# Patient Record
(demographics unavailable — no encounter records)

---

## 2024-10-13 NOTE — CONSULT LETTER
[Dear  ___] : Dear  [unfilled], [Consult Letter:] : I had the pleasure of evaluating your patient, [unfilled]. [Please see my note below.] : Please see my note below. [Consult Closing:] : Thank you very much for allowing me to participate in the care of this patient.  If you have any questions, please do not hesitate to contact me. [Sincerely,] : Sincerely, [DrLuz  ___] : Dr. ORTIZ [DrLuz ___] : Dr. ORTIZ

## 2024-10-17 NOTE — HISTORY OF PRESENT ILLNESS
[de-identified] : Mr. 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  His most recent procedure for this problem was MARCH 2023. He had excision of the area by dermatology (Dr. Puneet Hernandez) with clinically negative, but microscopically involved margins both peripherally and on the deep aspect.  No further therapy was recommended.  The patient was asymptomatic until November 2022 when he started to develop stabbing pains in the right temple, associated with growth of a mass in the area prompting him to return to dermatology .  An excisional biopsy in March 2023 by dermatology, diagnosed the recurrence. Margins were involved with procedure, and surgical consultation been recommended at that time. However, since his symptoms subsided after the excisional biopsy of the recurrence, he did not attention until now when the pain and swelling have resumed/recurred.  Pathology from May 2022 indicated negative margins around a poorly differentiated carcinoma. However, at that time the concern of a metastatic lesion was raised. No further indications or treatment for recommended at that time.  PET/CT 3/20/24: 1. Asymmetrically enlarged right parotid gland with heterogeneous increased FDG activity is suspicious for patient's primary malignancy. Further evaluation may be performed with ultrasound and percutaneous needle biopsy. 2. FDG-avid soft tissue thickening in superolateral aspect of right orbit, extending to right temporal region, corresponding to enhancing soft tissue seen on MRI dated 3/15/2024/known metastasis from a colon primary. 3. Few minimally FDG-avid bilateral upper lobe pulmonary nodules are indeterminate. If images of a prior CT chest are made available, direct comparison will be made and an addendum issued. Alternatively, dedicated CT of chest is recommended for further characterization. 4. Minimally FDG-avid bilateral adrenal nodules are indeterminate. Further characterization may be performed with MRI. 5. Nonspecific diffuse hypermetabolism in fundus and body of stomach with suggestion of wall thickening on CT. Please correlate clinically and with endoscopy. 6. Increased FDG activity in muscles along the anterior aspect of the proximal bilateral femurs may reflect inflammation. Please correlate clinically. 7. Small FDG-avid focus, left mid tibia, with corresponding abnormality on CT, is indeterminate. Further characterization may be performed with MRI.  MR head 3/15/24:  - Enhancing tissue within the right superolateral orbit extending from the orbital rim towards the superior orbital fissure. This tissue extends superficially into the right superolateral periorbital soft tissues with a bulky component which may be palpable. This is suspicious for recurrent viable neoplasm.  -In addition, there is heterogeneous enhancement and enlargement of the right parotid gland suspicious for metastatic disease. -Atrophy of the bilateral optic nerves and optic chiasm suggested.  On 4/9/24 underwent US guided FNA biopsy of right parotid mass:  Cytology: Poorly differentiated carcinoma with squamous differentiation.  Medical oncology consulted to discuss systemic neoadjuvant therapy to downstage tumor for potential resection in the future, vs primary therapy.  PAIN Stabbing pain right ear and eye.  5/23/24 - Angelita Present during visit, C1 Libtayo 5/2 C2 5/23 Pain on his right eye and ear has improved over the last 2-3 days. The swelling is less as well.  He continues to be teary from the right eye. Using Gabapentin 100 mg PO TID with relief. Mass also appears to be less swollen. Admits to fatigue on days 1-4 of his cycle which improved afterwards. Had an episode of "shakes" during D#3 of his cycle which subsided without acute intervention. No other irAEs noted. Admits to pain while swallowing solids. He is using protein shakes as recommended by nutrition.  6/13/24 C3 Libtayo No major irAEs. The pain and swelling on the right eye has improved while on treatment. The pain in the R ear has subsided. Only feels warmth but denies pain. His main complaint is excessive tearing from the right eye. He always needs to carry a handkerchief due to excessive watering. Eating well.  7/3/24 C4 Libtayo No major irAEs. The pain and swelling on the right eye and right temple has improved. He is able to open his mouth without any issues. Prior to treatment was having severe pain. The lesion on his right cantus is responding to treatment.  7/24/24 C5 Libtayo today. No major irAEs. The lesion on his right temple is responding appropriately to treatment. Continues to have issues with watery tear on the right eye.  9/3 TEB Reina: Jennifer accompanied on TEB C7 Libtayo on 9/7 No major irAEs The lesion on his right temple appears to be crusted over. Admits to less lacrimation from his right eye and pain. Not using pain medications. Labs on 8/18 are WNL. No acute abnormalities.  9/26/24 C8 Libtayo No major irAEs noted from treatment. The lesion on his right temple appears to be crusted over. Admits to less lacrimation from his right eye and pain. Not using pain medications. Using eye drops to the right eye. Patient will schedule MRI of the head.  10/17/2024 Patient seen in treatment room accompanied by Jose to receive C9 Libtayo today. The lesion on his right temple appears to be crusted over. Admits to less lacrimation from his right eye and pain. Not using pain medications. Using eye drops to the right eye. Has MR head scheduled 10/13/2024; pending read  Will also recommend to see Dr Curry for follow-up evaluation. I do not think more surgery is indicated, but there is a new subcutaneous lesion ~ 1.5 cm that is soft and mobile.  [de-identified] : Surgical oncology: Al Curry Dermatology: Puneet Hernandez Internist: Jermaine Schmidt  Cardiology: Irwin Killian Ophthalmology: Matthew Goldstein - Yovani Jones (is HCP as well) cell [phone] Jannetteshonna Jones - Jennifer lives 1 floor above in aprt building in [place] Sister - Saranya lives next door to him in same aprt building

## 2024-10-17 NOTE — PHYSICAL EXAM
[Ambulatory and capable of all self care but unable to carry out any work activities] : Status 2- Ambulatory and capable of all self care but unable to carry out any work activities. Up and about more than 50% of waking hours [Thin] : thin [Normal] : normal appearance, no rash, nodules, vesicles, ulcers, erythema [de-identified] : Right lateral canthus deformity and tumor into parotid.

## 2024-10-17 NOTE — HISTORY OF PRESENT ILLNESS
[de-identified] : Mr. 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  His most recent procedure for this problem was MARCH 2023. He had excision of the area by dermatology (Dr. Puneet Hernandez) with clinically negative, but microscopically involved margins both peripherally and on the deep aspect.  No further therapy was recommended.  The patient was asymptomatic until November 2022 when he started to develop stabbing pains in the right temple, associated with growth of a mass in the area prompting him to return to dermatology .  An excisional biopsy in March 2023 by dermatology, diagnosed the recurrence. Margins were involved with procedure, and surgical consultation been recommended at that time. However, since his symptoms subsided after the excisional biopsy of the recurrence, he did not attention until now when the pain and swelling have resumed/recurred.  Pathology from May 2022 indicated negative margins around a poorly differentiated carcinoma. However, at that time the concern of a metastatic lesion was raised. No further indications or treatment for recommended at that time.  PET/CT 3/20/24: 1. Asymmetrically enlarged right parotid gland with heterogeneous increased FDG activity is suspicious for patient's primary malignancy. Further evaluation may be performed with ultrasound and percutaneous needle biopsy. 2. FDG-avid soft tissue thickening in superolateral aspect of right orbit, extending to right temporal region, corresponding to enhancing soft tissue seen on MRI dated 3/15/2024/known metastasis from a colon primary. 3. Few minimally FDG-avid bilateral upper lobe pulmonary nodules are indeterminate. If images of a prior CT chest are made available, direct comparison will be made and an addendum issued. Alternatively, dedicated CT of chest is recommended for further characterization. 4. Minimally FDG-avid bilateral adrenal nodules are indeterminate. Further characterization may be performed with MRI. 5. Nonspecific diffuse hypermetabolism in fundus and body of stomach with suggestion of wall thickening on CT. Please correlate clinically and with endoscopy. 6. Increased FDG activity in muscles along the anterior aspect of the proximal bilateral femurs may reflect inflammation. Please correlate clinically. 7. Small FDG-avid focus, left mid tibia, with corresponding abnormality on CT, is indeterminate. Further characterization may be performed with MRI.  MR head 3/15/24:  - Enhancing tissue within the right superolateral orbit extending from the orbital rim towards the superior orbital fissure. This tissue extends superficially into the right superolateral periorbital soft tissues with a bulky component which may be palpable. This is suspicious for recurrent viable neoplasm.  -In addition, there is heterogeneous enhancement and enlargement of the right parotid gland suspicious for metastatic disease. -Atrophy of the bilateral optic nerves and optic chiasm suggested.  On 4/9/24 underwent US guided FNA biopsy of right parotid mass:  Cytology: Poorly differentiated carcinoma with squamous differentiation.  Medical oncology consulted to discuss systemic neoadjuvant therapy to downstage tumor for potential resection in the future, vs primary therapy.  PAIN Stabbing pain right ear and eye.  5/23/24 - Angelita Present during visit, C1 Libtayo 5/2 C2 5/23 Pain on his right eye and ear has improved over the last 2-3 days. The swelling is less as well.  He continues to be teary from the right eye. Using Gabapentin 100 mg PO TID with relief. Mass also appears to be less swollen. Admits to fatigue on days 1-4 of his cycle which improved afterwards. Had an episode of "shakes" during D#3 of his cycle which subsided without acute intervention. No other irAEs noted. Admits to pain while swallowing solids. He is using protein shakes as recommended by nutrition.  6/13/24 C3 Libtayo No major irAEs. The pain and swelling on the right eye has improved while on treatment. The pain in the R ear has subsided. Only feels warmth but denies pain. His main complaint is excessive tearing from the right eye. He always needs to carry a handkerchief due to excessive watering. Eating well.  7/3/24 C4 Libtayo No major irAEs. The pain and swelling on the right eye and right temple has improved. He is able to open his mouth without any issues. Prior to treatment was having severe pain. The lesion on his right cantus is responding to treatment.  7/24/24 C5 Libtayo today. No major irAEs. The lesion on his right temple is responding appropriately to treatment. Continues to have issues with watery tear on the right eye.  9/3 TEB Reina: Jennifer accompanied on TEB C7 Libtayo on 9/7 No major irAEs The lesion on his right temple appears to be crusted over. Admits to less lacrimation from his right eye and pain. Not using pain medications. Labs on 8/18 are WNL. No acute abnormalities.  9/26/24 C8 Libtayo No major irAEs noted from treatment. The lesion on his right temple appears to be crusted over. Admits to less lacrimation from his right eye and pain. Not using pain medications. Using eye drops to the right eye. Patient will schedule MRI of the head.  10/17/2024 Patient seen in treatment room accompanied by Jose to receive C9 Libtayo today. The lesion on his right temple appears to be crusted over. Admits to less lacrimation from his right eye and pain. Not using pain medications. Using eye drops to the right eye. Has MR head scheduled 10/13/2024; pending read  Will also recommend to see Dr Curry for follow-up evaluation. I do not think more surgery is indicated, but there is a new subcutaneous lesion ~ 1.5 cm that is soft and mobile.  [de-identified] : Surgical oncology: Al Curry Dermatology: Puneet Hernandez Internist: Jermaine Schmidt  Cardiology: Irwin Killian Ophthalmology: Matthew Goldstein - Yovani Jones (is HCP as well) cell [phone] Jannetteshonna Jones - Jennifer lives 1 floor above in aprt building in [place] Sister - Saranya lives next door to him in same aprt building

## 2024-10-17 NOTE — PHYSICAL EXAM
[Ambulatory and capable of all self care but unable to carry out any work activities] : Status 2- Ambulatory and capable of all self care but unable to carry out any work activities. Up and about more than 50% of waking hours [Thin] : thin [Normal] : normal appearance, no rash, nodules, vesicles, ulcers, erythema [de-identified] : Right lateral canthus deformity and tumor into parotid.

## 2024-10-17 NOTE — ASSESSMENT
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 The patient was asymptomatic until November 2022 when he started to develop stabbing pains in the right temple, associated with growth of a mass in the area prompting him to return to dermatology. An excisional biopsy in March 2023 by dermatology, diagnosed the recurrence. Margins were involved with procedure, and surgical consultation been recommended at that time. However, since his symptoms subsided after the excisional biopsy of the recurrence, he did not pay attention until now when the pain and swelling have resumed/recurred.  PET/CT 3/20/24: 1. Asymmetrically enlarged right parotid gland with heterogeneous increased FDG activity is suspicious for patient's primary malignancy. Further evaluation may be performed with ultrasound and percutaneous needle biopsy. 2. FDG-avid soft tissue thickening in superolateral aspect of right orbit, extending to right temporal region, corresponding to enhancing soft tissue seen on MRI dated 3/15/2024/known metastasis from a colon primary.  MR head 3/15/24:  - Enhancing tissue within the right superolateral orbit extending from the orbital rim towards the superior orbital fissure. This tissue extends superficially into the right superolateral periorbital soft tissues with a bulky component which may be palpable. This is suspicious for recurrent viable neoplasm.  -In addition, there is heterogeneous enhancement and enlargement of the right parotid gland suspicious for metastatic disease.  On 4/9/24 underwent US guided FNA biopsy of right parotid mass:  Cytology: Poorly differentiated carcinoma with squamous differentiation.  C1 Libtayo 5/2/24 C9 10/17/24  Patient is responding well to treatment.  The mass on his right lateral canthus is nearly absent. The pain in both his right eye and right temple has improved substantially. Only using Gabapentin at bedtime now.  He is able to open his R eye. The constant tearing from his right eye has improved. Admits to pruritus and intermittent episode of otalgia but not othersome. Continue to follow up with ophthalmology.   MRI head performed 10/13/2024; pending read; will expediate   Continue to follow up with Dr. Curry to consider possible bone grafting for orbital bony protrusion.  labs reviewed.  Has MR head scheduled 10/13/2024; pending read  Will also recommend to see Dr Curry for follow-up evaluation. I do not think more surgery is indicated, but there is a new subcutaneous lesion ~ 1.5 cm that is soft and mobile.  Given vision issues, and cataract ?, will contact ophthalmologist to proceed with treatment. f/u q cycle.

## 2024-11-07 NOTE — PHYSICAL EXAM
[Ambulatory and capable of all self care but unable to carry out any work activities] : Status 2- Ambulatory and capable of all self care but unable to carry out any work activities. Up and about more than 50% of waking hours [Thin] : thin [Normal] : normal appearance, no rash, nodules, vesicles, ulcers, erythema [de-identified] : Right lateral canthus deformity and tumor into parotid.

## 2024-11-07 NOTE — HISTORY OF PRESENT ILLNESS
[de-identified] : Mr. 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  His most recent procedure for this problem was MARCH 2023. He had excision of the area by dermatology (Dr. Puneet Hernandez) with clinically negative, but microscopically involved margins both peripherally and on the deep aspect.  No further therapy was recommended.  The patient was asymptomatic until November 2022 when he started to develop stabbing pains in the right temple, associated with growth of a mass in the area prompting him to return to dermatology .  An excisional biopsy in March 2023 by dermatology, diagnosed the recurrence. Margins were involved with procedure, and surgical consultation been recommended at that time. However, since his symptoms subsided after the excisional biopsy of the recurrence, he did not attention until now when the pain and swelling have resumed/recurred.  Pathology from May 2022 indicated negative margins around a poorly differentiated carcinoma. However, at that time the concern of a metastatic lesion was raised. No further indications or treatment for recommended at that time.  PET/CT 3/20/24: 1. Asymmetrically enlarged right parotid gland with heterogeneous increased FDG activity is suspicious for patient's primary malignancy. Further evaluation may be performed with ultrasound and percutaneous needle biopsy. 2. FDG-avid soft tissue thickening in superolateral aspect of right orbit, extending to right temporal region, corresponding to enhancing soft tissue seen on MRI dated 3/15/2024/known metastasis from a colon primary. 3. Few minimally FDG-avid bilateral upper lobe pulmonary nodules are indeterminate. If images of a prior CT chest are made available, direct comparison will be made and an addendum issued. Alternatively, dedicated CT of chest is recommended for further characterization. 4. Minimally FDG-avid bilateral adrenal nodules are indeterminate. Further characterization may be performed with MRI. 5. Nonspecific diffuse hypermetabolism in fundus and body of stomach with suggestion of wall thickening on CT. Please correlate clinically and with endoscopy. 6. Increased FDG activity in muscles along the anterior aspect of the proximal bilateral femurs may reflect inflammation. Please correlate clinically. 7. Small FDG-avid focus, left mid tibia, with corresponding abnormality on CT, is indeterminate. Further characterization may be performed with MRI.  MR head 3/15/24:  - Enhancing tissue within the right superolateral orbit extending from the orbital rim towards the superior orbital fissure. This tissue extends superficially into the right superolateral periorbital soft tissues with a bulky component which may be palpable. This is suspicious for recurrent viable neoplasm.  -In addition, there is heterogeneous enhancement and enlargement of the right parotid gland suspicious for metastatic disease. -Atrophy of the bilateral optic nerves and optic chiasm suggested.  On 4/9/24 underwent US guided FNA biopsy of right parotid mass:  Cytology: Poorly differentiated carcinoma with squamous differentiation.  Medical oncology consulted to discuss systemic neoadjuvant therapy to downstage tumor for potential resection in the future, vs primary therapy.  PAIN Stabbing pain right ear and eye.  5/23/24 - Angelita Present during visit, C1 Libtayo 5/2 C2 5/23 Pain on his right eye and ear has improved over the last 2-3 days. The swelling is less as well.  He continues to be teary from the right eye. Using Gabapentin 100 mg PO TID with relief. Mass also appears to be less swollen. Admits to fatigue on days 1-4 of his cycle which improved afterwards. Had an episode of "shakes" during D#3 of his cycle which subsided without acute intervention. No other irAEs noted. Admits to pain while swallowing solids. He is using protein shakes as recommended by nutrition.  6/13/24 C3 Libtayo No major irAEs. The pain and swelling on the right eye has improved while on treatment. The pain in the R ear has subsided. Only feels warmth but denies pain. His main complaint is excessive tearing from the right eye. He always needs to carry a handkerchief due to excessive watering. Eating well.  7/3/24 C4 Libtayo No major irAEs. The pain and swelling on the right eye and right temple has improved. He is able to open his mouth without any issues. Prior to treatment was having severe pain. The lesion on his right cantus is responding to treatment.  7/24/24 C5 Libtayo today. No major irAEs. The lesion on his right temple is responding appropriately to treatment. Continues to have issues with watery tear on the right eye.  9/3 TEB Reina: Jennifer accompanied on TEB C7 Libtayo on 9/7 No major irAEs The lesion on his right temple appears to be crusted over. Admits to less lacrimation from his right eye and pain. Not using pain medications. Labs on 8/18 are WNL. No acute abnormalities.  9/26/24 C8 Libtayo No major irAEs noted from treatment. The lesion on his right temple appears to be crusted over. Admits to less lacrimation from his right eye and pain. Not using pain medications. Using eye drops to the right eye. Patient will schedule MRI of the head.  10/17/2024 Patient seen in treatment room accompanied by Jose to receive C9 Libtayo today. The lesion on his right temple appears to be crusted over. Admits to less lacrimation from his right eye and pain. Not using pain medications. Using eye drops to the right eye. Has MR head scheduled 10/13/2024; pending read  Will also recommend to see Dr Curry for follow-up evaluation. I do not think more surgery is indicated, but there is a new subcutaneous lesion ~ 1.5 cm that is soft and mobile.  11/7/24 C10 Libtayo today MR head on 10/13/24 reveals mix response. Improved local disease in the right orbit and parotid gland, suboptimally evaluated here. 3.  Worsened metastatic lymph node in the right temporalis fossa. 4.  Stable chronic infarcts and senescent cerebral changes.   [de-identified] : Surgical oncology: Al Curry Dermatology: Puneet Hernandez Internist: Jermaine Schmidt  Cardiology: Irwin Killian Ophthalmology: Matthew Goldstein - Yovani Jones (is HCP as well) cell [phone] Jannetteshonna Jonse - Jennifer lives 1 floor above in aprt building in [place] Sister - Saranya lives next door to him in same aprt building

## 2024-11-07 NOTE — ASSESSMENT
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 The patient was asymptomatic until November 2022 when he started to develop stabbing pains in the right temple, associated with growth of a mass in the area prompting him to return to dermatology. An excisional biopsy in March 2023 by dermatology, diagnosed the recurrence. Margins were involved with procedure, and surgical consultation been recommended at that time. However, since his symptoms subsided after the excisional biopsy of the recurrence, he did not pay attention until now when the pain and swelling have resumed/recurred.  PET/CT 3/20/24: 1. Asymmetrically enlarged right parotid gland with heterogeneous increased FDG activity is suspicious for patient's primary malignancy. Further evaluation may be performed with ultrasound and percutaneous needle biopsy. 2. FDG-avid soft tissue thickening in superolateral aspect of right orbit, extending to right temporal region, corresponding to enhancing soft tissue seen on MRI dated 3/15/2024/known metastasis from a colon primary.  MR head 3/15/24:  - Enhancing tissue within the right superolateral orbit extending from the orbital rim towards the superior orbital fissure. This tissue extends superficially into the right superolateral periorbital soft tissues with a bulky component which may be palpable. This is suspicious for recurrent viable neoplasm.  -In addition, there is heterogeneous enhancement and enlargement of the right parotid gland suspicious for metastatic disease.  On 4/9/24 underwent US guided FNA biopsy of right parotid mass:  Cytology: Poorly differentiated carcinoma with squamous differentiation.  C1 Libtayo 5/2/24 C10 11/7/24  Patient is responding well to treatment.  The mass on his right lateral canthus is nearly absent. The pain in both his right eye and right temple has improved substantially. Only using Gabapentin at bedtime now.  He is able to open his R eye. The constant tearing from his right eye has improved. Admits to pruritus and intermittent episode of otalgia but not othersome. Continue to follow up with ophthalmology.   He underwent MR head on 10/13/24 which reveals the following;  Improved local disease in the right orbit and parotid gland, suboptimally evaluated here. 3.  Worsened metastatic lymph node in the right temporalis fossa. 4.  Stable chronic infarcts and senescent cerebral changes.  Patient was advised to speak with Dr. Curry for consideration of resection.  F/U q cycle.  Dr. Marie agrees with above plan.

## 2024-11-27 NOTE — HISTORY OF PRESENT ILLNESS
[de-identified] : Mr. 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  His most recent procedure for this problem was MARCH 2023. He had excision of the area by dermatology (Dr. Puneet Hernandez) with clinically negative, but microscopically involved margins both peripherally and on the deep aspect.  No further therapy was recommended.  The patient was asymptomatic until November 2022 when he started to develop stabbing pains in the right temple, associated with growth of a mass in the area prompting him to return to dermatology .  An excisional biopsy in March 2023 by dermatology, diagnosed the recurrence. Margins were involved with procedure, and surgical consultation been recommended at that time. However, since his symptoms subsided after the excisional biopsy of the recurrence, he did not attention until now when the pain and swelling have resumed/recurred.  Pathology from May 2022 indicated negative margins around a poorly differentiated carcinoma. However, at that time the concern of a metastatic lesion was raised. No further indications or treatment for recommended at that time.  PET/CT 3/20/24: 1. Asymmetrically enlarged right parotid gland with heterogeneous increased FDG activity is suspicious for patient's primary malignancy. Further evaluation may be performed with ultrasound and percutaneous needle biopsy. 2. FDG-avid soft tissue thickening in superolateral aspect of right orbit, extending to right temporal region, corresponding to enhancing soft tissue seen on MRI dated 3/15/2024/known metastasis from a colon primary. 3. Few minimally FDG-avid bilateral upper lobe pulmonary nodules are indeterminate. If images of a prior CT chest are made available, direct comparison will be made and an addendum issued. Alternatively, dedicated CT of chest is recommended for further characterization. 4. Minimally FDG-avid bilateral adrenal nodules are indeterminate. Further characterization may be performed with MRI. 5. Nonspecific diffuse hypermetabolism in fundus and body of stomach with suggestion of wall thickening on CT. Please correlate clinically and with endoscopy. 6. Increased FDG activity in muscles along the anterior aspect of the proximal bilateral femurs may reflect inflammation. Please correlate clinically. 7. Small FDG-avid focus, left mid tibia, with corresponding abnormality on CT, is indeterminate. Further characterization may be performed with MRI.  MR head 3/15/24:  - Enhancing tissue within the right superolateral orbit extending from the orbital rim towards the superior orbital fissure. This tissue extends superficially into the right superolateral periorbital soft tissues with a bulky component which may be palpable. This is suspicious for recurrent viable neoplasm.  -In addition, there is heterogeneous enhancement and enlargement of the right parotid gland suspicious for metastatic disease. -Atrophy of the bilateral optic nerves and optic chiasm suggested.  On 4/9/24 underwent US guided FNA biopsy of right parotid mass:  Cytology: Poorly differentiated carcinoma with squamous differentiation.  Medical oncology consulted to discuss systemic neoadjuvant therapy to downstage tumor for potential resection in the future, vs primary therapy.  PAIN Stabbing pain right ear and eye.  5/23/24 - Angelita Present during visit, C1 Libtayo 5/2 C2 5/23 Pain on his right eye and ear has improved over the last 2-3 days. The swelling is less as well.  He continues to be teary from the right eye. Using Gabapentin 100 mg PO TID with relief. Mass also appears to be less swollen. Admits to fatigue on days 1-4 of his cycle which improved afterwards. Had an episode of "shakes" during D#3 of his cycle which subsided without acute intervention. No other irAEs noted. Admits to pain while swallowing solids. He is using protein shakes as recommended by nutrition.  6/13/24 C3 Libtayo No major irAEs. The pain and swelling on the right eye has improved while on treatment. The pain in the R ear has subsided. Only feels warmth but denies pain. His main complaint is excessive tearing from the right eye. He always needs to carry a handkerchief due to excessive watering. Eating well.  7/3/24 C4 Libtayo No major irAEs. The pain and swelling on the right eye and right temple has improved. He is able to open his mouth without any issues. Prior to treatment was having severe pain. The lesion on his right cantus is responding to treatment.  7/24/24 C5 Libtayo today. No major irAEs. The lesion on his right temple is responding appropriately to treatment. Continues to have issues with watery tear on the right eye.  9/3 TEB Reina: Jennifer accompanied on TEB C7 Libtayo on 9/7 No major irAEs The lesion on his right temple appears to be crusted over. Admits to less lacrimation from his right eye and pain. Not using pain medications. Labs on 8/18 are WNL. No acute abnormalities.  9/26/24 C8 Libtayo No major irAEs noted from treatment. The lesion on his right temple appears to be crusted over. Admits to less lacrimation from his right eye and pain. Not using pain medications. Using eye drops to the right eye. Patient will schedule MRI of the head.  10/17/2024 Patient seen in treatment room accompanied by nancy and MARIA DE JESUS to receive C9 Libtayo today. The lesion on his right temple appears to be crusted over. Admits to less lacrimation from his right eye and pain. Not using pain medications. Using eye drops to the right eye. Has MR head scheduled 10/13/2024; pending read  Will also recommend to see Dr Curry for follow-up evaluation. I do not think more surgery is indicated, but there is a new subcutaneous lesion ~ 1.5 cm that is soft and mobile.  11/27/24 C11 Libtayo today MR head on 10/13/24 reveals mix response. Improved local disease in the right orbit and parotid gland, suboptimally evaluated here. 3.  Worsened metastatic lymph node in the right temporalis fossa. 4.  Stable chronic infarcts and senescent cerebral changes. Doing relatively well on treatment. No major irAEs.   [de-identified] : Surgical oncology: Al Curry Dermatology: Puneet Hernandez Internist: Jermaine Schmidt  Cardiology: Irwin Killian Ophthalmology: Matthew Goldstein - Yovani Jones (is HCP as well) cell [phone] Jannetteshonna Jones - Jennifer lives 1 floor above in aprt building in [place] Sister - Saranya lives next door to him in same aprt building

## 2024-11-27 NOTE — PHYSICAL EXAM
[Ambulatory and capable of all self care but unable to carry out any work activities] : Status 2- Ambulatory and capable of all self care but unable to carry out any work activities. Up and about more than 50% of waking hours [Thin] : thin [Normal] : normal appearance, no rash, nodules, vesicles, ulcers, erythema [de-identified] : Right lateral canthus deformity and tumor into parotid.

## 2024-11-27 NOTE — ASSESSMENT
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 The patient was asymptomatic until November 2022 when he started to develop stabbing pains in the right temple, associated with growth of a mass in the area prompting him to return to dermatology. An excisional biopsy in March 2023 by dermatology, diagnosed the recurrence. Margins were involved with procedure, and surgical consultation been recommended at that time. However, since his symptoms subsided after the excisional biopsy of the recurrence, he did not pay attention until now when the pain and swelling have resumed/recurred.  PET/CT 3/20/24: 1. Asymmetrically enlarged right parotid gland with heterogeneous increased FDG activity is suspicious for patient's primary malignancy. Further evaluation may be performed with ultrasound and percutaneous needle biopsy. 2. FDG-avid soft tissue thickening in superolateral aspect of right orbit, extending to right temporal region, corresponding to enhancing soft tissue seen on MRI dated 3/15/2024/known metastasis from a colon primary.  MR head 3/15/24:  - Enhancing tissue within the right superolateral orbit extending from the orbital rim towards the superior orbital fissure. This tissue extends superficially into the right superolateral periorbital soft tissues with a bulky component which may be palpable. This is suspicious for recurrent viable neoplasm.  -In addition, there is heterogeneous enhancement and enlargement of the right parotid gland suspicious for metastatic disease.  On 4/9/24 underwent US guided FNA biopsy of right parotid mass:  Cytology: Poorly differentiated carcinoma with squamous differentiation.  C1 Libtayo 5/2/24 C11 11/27/24  Patient is responding well to treatment.  The mass on his right lateral canthus is nearly absent. The pain in both his right eye and right temple has improved substantially. Only using Gabapentin at bedtime now.  He is able to open his R eye. The constant tearing from his right eye has improved. Admits to pruritus and intermittent episode of otalgia but not othersome. Continue to follow up with ophthalmology.   He underwent MR head on 10/13/24 which reveals the following;  Improved local disease in the right orbit and parotid gland, suboptimally evaluated here. 3.  Worsened metastatic lymph node in the right temporalis fossa. 4.  Stable chronic infarcts and senescent cerebral changes.  Patient was advised to speak with Dr. Curry for consideration of resection. Patient was seen by Dr. Curry and will perform US biopsy of 12/3/24  F/U q cycle.

## 2024-12-19 NOTE — PHYSICAL EXAM
[Ambulatory and capable of all self care but unable to carry out any work activities] : Status 2- Ambulatory and capable of all self care but unable to carry out any work activities. Up and about more than 50% of waking hours [Thin] : thin [Normal] : normal appearance, no rash, nodules, vesicles, ulcers, erythema [de-identified] : Right lateral canthus deformity and tumor into parotid.

## 2024-12-19 NOTE — HISTORY OF PRESENT ILLNESS
[de-identified] : Mr. 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  His most recent procedure for this problem was MARCH 2023. He had excision of the area by dermatology (Dr. Puneet Hernandez) with clinically negative, but microscopically involved margins both peripherally and on the deep aspect.  No further therapy was recommended.  The patient was asymptomatic until November 2022 when he started to develop stabbing pains in the right temple, associated with growth of a mass in the area prompting him to return to dermatology .  An excisional biopsy in March 2023 by dermatology, diagnosed the recurrence. Margins were involved with procedure, and surgical consultation been recommended at that time. However, since his symptoms subsided after the excisional biopsy of the recurrence, he did not attention until now when the pain and swelling have resumed/recurred.  Pathology from May 2022 indicated negative margins around a poorly differentiated carcinoma. However, at that time the concern of a metastatic lesion was raised. No further indications or treatment for recommended at that time.  PET/CT 3/20/24: 1. Asymmetrically enlarged right parotid gland with heterogeneous increased FDG activity is suspicious for patient's primary malignancy. Further evaluation may be performed with ultrasound and percutaneous needle biopsy. 2. FDG-avid soft tissue thickening in superolateral aspect of right orbit, extending to right temporal region, corresponding to enhancing soft tissue seen on MRI dated 3/15/2024/known metastasis from a colon primary. 3. Few minimally FDG-avid bilateral upper lobe pulmonary nodules are indeterminate. If images of a prior CT chest are made available, direct comparison will be made and an addendum issued. Alternatively, dedicated CT of chest is recommended for further characterization. 4. Minimally FDG-avid bilateral adrenal nodules are indeterminate. Further characterization may be performed with MRI. 5. Nonspecific diffuse hypermetabolism in fundus and body of stomach with suggestion of wall thickening on CT. Please correlate clinically and with endoscopy. 6. Increased FDG activity in muscles along the anterior aspect of the proximal bilateral femurs may reflect inflammation. Please correlate clinically. 7. Small FDG-avid focus, left mid tibia, with corresponding abnormality on CT, is indeterminate. Further characterization may be performed with MRI.  MR head 3/15/24:  - Enhancing tissue within the right superolateral orbit extending from the orbital rim towards the superior orbital fissure. This tissue extends superficially into the right superolateral periorbital soft tissues with a bulky component which may be palpable. This is suspicious for recurrent viable neoplasm.  -In addition, there is heterogeneous enhancement and enlargement of the right parotid gland suspicious for metastatic disease. -Atrophy of the bilateral optic nerves and optic chiasm suggested.  On 4/9/24 underwent US guided FNA biopsy of right parotid mass:  Cytology: Poorly differentiated carcinoma with squamous differentiation.  Medical oncology consulted to discuss systemic neoadjuvant therapy to downstage tumor for potential resection in the future, vs primary therapy.  PAIN Stabbing pain right ear and eye.  5/23/24 - Angelita Present during visit, C1 Libtayo 5/2 C2 5/23 Pain on his right eye and ear has improved over the last 2-3 days. The swelling is less as well.  He continues to be teary from the right eye. Using Gabapentin 100 mg PO TID with relief. Mass also appears to be less swollen. Admits to fatigue on days 1-4 of his cycle which improved afterwards. Had an episode of "shakes" during D#3 of his cycle which subsided without acute intervention. No other irAEs noted. Admits to pain while swallowing solids. He is using protein shakes as recommended by nutrition.  6/13/24 C3 Libtayo No major irAEs. The pain and swelling on the right eye has improved while on treatment. The pain in the R ear has subsided. Only feels warmth but denies pain. His main complaint is excessive tearing from the right eye. He always needs to carry a handkerchief due to excessive watering. Eating well.  7/3/24 C4 Libtayo No major irAEs. The pain and swelling on the right eye and right temple has improved. He is able to open his mouth without any issues. Prior to treatment was having severe pain. The lesion on his right cantus is responding to treatment.  7/24/24 C5 Libtayo today. No major irAEs. The lesion on his right temple is responding appropriately to treatment. Continues to have issues with watery tear on the right eye.  9/3 TEB Reina: Jennifer accompanied on TEB C7 Libtayo on 9/7 No major irAEs The lesion on his right temple appears to be crusted over. Admits to less lacrimation from his right eye and pain. Not using pain medications. Labs on 8/18 are WNL. No acute abnormalities.  9/26/24 C8 Libtayo No major irAEs noted from treatment. The lesion on his right temple appears to be crusted over. Admits to less lacrimation from his right eye and pain. Not using pain medications. Using eye drops to the right eye. Patient will schedule MRI of the head.  10/17/2024 Patient seen in treatment room accompanied by nancy and MARIA DE JESUS to receive C9 Libtayo today. The lesion on his right temple appears to be crusted over. Admits to less lacrimation from his right eye and pain. Not using pain medications. Using eye drops to the right eye. Has MR head scheduled 10/13/2024; pending read  Will also recommend to see Dr Curry for follow-up evaluation. I do not think more surgery is indicated, but there is a new subcutaneous lesion ~ 1.5 cm that is soft and mobile.  11/27/24 C11 Libtayo today MR head on 10/13/24 reveals mix response. Improved local disease in the right orbit and parotid gland, suboptimally evaluated here. 3.  Worsened metastatic lymph node in the right temporalis fossa. 4.  Stable chronic infarcts and senescent cerebral changes. Doing relatively well on treatment. No major irAEs.  12/19/24 From the clinical exam, symptoms and MRI the lateral canthus disease improved significantly while on Libtayo. Next non-operative choices: 1 - continue Libtayo and radiate the temporal lesion 2 - change to IPI 1 + NIVO 3 (eg Matisse trial) 3 - start cetuximab and try to reduce size of lesion 4 - radiation On discussing with Dr Martínez, will proceed with cetuximab and radiation combination. We re-reviewed logistics, mechanism of action and most common adverse reactions.   [de-identified] : Surgical oncology: Al Curry Dermatology: Puneet Hernandez Internist: Jermaine Schmidt  Cardiology: Irwin Killian Ophthalmology: Matthew Goldstein - Yovani Jones (is HCP as well) cell [phone] Jannetteshonna Jones - Jennifer lives 1 floor above in aprt building in [place] Sister - Saranya lives next door to him in same aprt building

## 2024-12-19 NOTE — ASSESSMENT
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 The patient was asymptomatic until November 2022 when he started to develop stabbing pains in the right temple, associated with growth of a mass in the area prompting him to return to dermatology. An excisional biopsy in March 2023 by dermatology, diagnosed the recurrence. Margins were involved with procedure, and surgical consultation been recommended at that time. However, since his symptoms subsided after the excisional biopsy of the recurrence, he did not pay attention until now when the pain and swelling have resumed/recurred.  PET/CT 3/20/24: 1. Asymmetrically enlarged right parotid gland with heterogeneous increased FDG activity is suspicious for patient's primary malignancy. Further evaluation may be performed with ultrasound and percutaneous needle biopsy. 2. FDG-avid soft tissue thickening in superolateral aspect of right orbit, extending to right temporal region, corresponding to enhancing soft tissue seen on MRI dated 3/15/2024/known metastasis from a colon primary.  MR head 3/15/24:  - Enhancing tissue within the right superolateral orbit extending from the orbital rim towards the superior orbital fissure. This tissue extends superficially into the right superolateral periorbital soft tissues with a bulky component which may be palpable. This is suspicious for recurrent viable neoplasm.  -In addition, there is heterogeneous enhancement and enlargement of the right parotid gland suspicious for metastatic disease.  On 4/9/24 underwent US guided FNA biopsy of right parotid mass:  Cytology: Poorly differentiated carcinoma with squamous differentiation.  C1 Libtayo 5/2/24 C11 11/27/24  Patient is responding well to treatment.  The mass on his right lateral canthus is nearly absent. The pain in both his right eye and right temple has improved substantially. Only using Gabapentin at bedtime now.  He is able to open his R eye. The constant tearing from his right eye has improved. Admits to pruritus and intermittent episode of otalgia but not othersome. Continue to follow up with ophthalmology.   He underwent MR head on 10/13/24 which reveals the following;  Improved local disease in the right orbit and parotid gland, suboptimally evaluated here. 3.  Worsened metastatic lymph node in the right temporalis fossa. 4.  Stable chronic infarcts and senescent cerebral changes.  From the clinical exam, symptoms and MRI the lateral canthus disease improved significantly while on Libtayo. Next non-operative choices: 1 - continue Libtayo and radiate the temporal lesion 2 - change to IPI 1 + NIVO 3 (eg Matisse trial) 3 - start cetuximab and try to reduce size of lesion 4 - radiation  On discussing with Dr Martínez, will proceed with cetuximab and radiation combination. We re-reviewed logistics, mechanism of action and most common adverse reactions.  C1 Cetuximab 12/19  consultation with Dr. Martínez on 12/24/24  f/u q cycle.  Dr. Marie agrees with above plan.

## 2024-12-24 NOTE — VITALS
[Maximal Pain Intensity: 0/10] : 0/10 [Least Pain Intensity: 0/10] : 0/10 [50: Requires considerable assistance and frequent medical care.] : 50: Requires considerable assistance and frequent medical care. [Date: ____________] : Patient's last distress assessment performed on [unfilled]. [3 - Distress Level] : Distress Level: 3

## 2024-12-24 NOTE — REVIEW OF SYSTEMS
[Visual Disturbances] : visual disturbances [Loss of Hearing] : loss of hearing [Negative] : Allergic/Immunologic [Fatigue: Grade 0] : Fatigue: Grade 0 [Tinnitus - Grade 0] : Tinnitus - Grade 0 [Blurred Vision: Grade 1 - Intervention not indicated] : Blurred Vision: Grade 1 - Intervention not indicated [Cognitive Disturbance: Grade 0] : Cognitive Disturbance: Grade 0 [Dizziness: Grade 0] : Dizziness: Grade 0  [Headache: Grade 0] : Headache: Grade 0 [Lethargy: Grade 0] : Lethargy: Grade 0 [Cough: Grade 0] : Cough: Grade 0 [Pruritus: Grade 0] : Pruritus: Grade 0

## 2024-12-24 NOTE — REASON FOR VISIT
[Consideration of Curative Therapy] : consideration of curative therapy for [Other: ___] : [unfilled] [Family Member] : family member [Negative] : Genitourinary

## 2024-12-24 NOTE — HISTORY OF PRESENT ILLNESS
[FreeTextEntry1] :  84-year-old male diagnosed with SCC of RIGHT eye referred by Dr. Marie  3/15/24 MRI Brain IMPRESSION: Enhancing tissue within the right superolateral orbit extending from the orbital rim towards the superior orbital fissure. This tissue extends superficially into the right superolateral periorbital soft tissues with a bulky component which may be palpable. This is suspicious for recurrent viable neoplasm.  In addition, there is heterogeneous enhancement and enlargement of the right parotid gland suspicious for metastatic disease. Atrophy of the bilateral optic nerves and optic chiasm suggested.  3/20/24 PET/CT IMPRESSION: Abnormal skull-to-thigh FDG-PET/CT scan. 1. Asymmetrically enlarged right parotid gland with heterogeneous increased FDG activity is suspicious for patient's primary malignancy. Further evaluation may be performed with ultrasound and percutaneous needle biopsy. 2. FDG-avid soft tissue thickening in superolateral aspect of right orbit, extending to right temporal region, corresponding to enhancing soft tissue seen on MRI dated 3/15/2024/known metastasis from a colon primary. 3. Few minimally FDG-avid bilateral upper lobe pulmonary nodules are indeterminate. If images of a prior CT chest are made available, direct comparison will be made and an addendum issued. Alternatively, dedicated CT of chest is recommended for further characterization. 4. Minimally FDG-avid bilateral adrenal nodules are indeterminate. Further characterization may be performed with MRI. 5. Nonspecific diffuse hypermetabolism in fundus and body of stomach with suggestion of wall thickening on CT. Please correlate clinically and with endoscopy. 6. Increased FDG activity in muscles along the anterior aspect of the proximal bilateral femurs may reflect inflammation. Please correlate clinically. 7. Small FDG-avid focus, left mid tibia, with corresponding abnormality on CT, is indeterminate. Further characterization may be performed with MRI.  4/9/24 underwent US guided FNA biopsy of RIGHT  parotid mass: Poorly differentiated carcinoma with squamous differentiation.  10/13/24 MRI Brain IMPRESSION: 1.  Mixed response to therapy. 2.  Improved local disease in the right orbit and parotid gland, suboptimally evaluated here. 3.  Worsened metastatic lymph node in the right temporalis fossa. 4.  Stable chronic infarcts and senescent cerebral changes.  12/03/24 TEMPORAL, RIGHT, US GUIDED CORE BIOPSY AND FNA - POSITIVE FOR MALIGNANT CELLS - Squamous Cell Carcinoma.  12/24/24 Patient presents today to discuss role of radiation therapy in his care. Patient have trouble hearing. As per family member patient experiences blurry vison.  Continues follow up with Med/Onc Dr. Marie - will proceed with cetuximab and radiation combination.

## 2025-01-16 NOTE — PHYSICAL EXAM
[Ambulatory and capable of all self care but unable to carry out any work activities] : Status 2- Ambulatory and capable of all self care but unable to carry out any work activities. Up and about more than 50% of waking hours [Thin] : thin [Normal] : normal appearance, no rash, nodules, vesicles, ulcers, erythema [de-identified] : Right lateral canthus deformity and tumor into parotid.

## 2025-01-16 NOTE — HISTORY OF PRESENT ILLNESS
[de-identified] : Mr. 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  His most recent procedure for this problem was MARCH 2023. He had excision of the area by dermatology (Dr. Puneet Hernandez) with clinically negative, but microscopically involved margins both peripherally and on the deep aspect.  No further therapy was recommended.  The patient was asymptomatic until November 2022 when he started to develop stabbing pains in the right temple, associated with growth of a mass in the area prompting him to return to dermatology .  An excisional biopsy in March 2023 by dermatology, diagnosed the recurrence. Margins were involved with procedure, and surgical consultation been recommended at that time. However, since his symptoms subsided after the excisional biopsy of the recurrence, he did not attention until now when the pain and swelling have resumed/recurred.  Pathology from May 2022 indicated negative margins around a poorly differentiated carcinoma. However, at that time the concern of a metastatic lesion was raised. No further indications or treatment for recommended at that time.  PET/CT 3/20/24: 1. Asymmetrically enlarged right parotid gland with heterogeneous increased FDG activity is suspicious for patient's primary malignancy. Further evaluation may be performed with ultrasound and percutaneous needle biopsy. 2. FDG-avid soft tissue thickening in superolateral aspect of right orbit, extending to right temporal region, corresponding to enhancing soft tissue seen on MRI dated 3/15/2024/known metastasis from a colon primary. 3. Few minimally FDG-avid bilateral upper lobe pulmonary nodules are indeterminate. If images of a prior CT chest are made available, direct comparison will be made and an addendum issued. Alternatively, dedicated CT of chest is recommended for further characterization. 4. Minimally FDG-avid bilateral adrenal nodules are indeterminate. Further characterization may be performed with MRI. 5. Nonspecific diffuse hypermetabolism in fundus and body of stomach with suggestion of wall thickening on CT. Please correlate clinically and with endoscopy. 6. Increased FDG activity in muscles along the anterior aspect of the proximal bilateral femurs may reflect inflammation. Please correlate clinically. 7. Small FDG-avid focus, left mid tibia, with corresponding abnormality on CT, is indeterminate. Further characterization may be performed with MRI.  MR head 3/15/24:  - Enhancing tissue within the right superolateral orbit extending from the orbital rim towards the superior orbital fissure. This tissue extends superficially into the right superolateral periorbital soft tissues with a bulky component which may be palpable. This is suspicious for recurrent viable neoplasm.  -In addition, there is heterogeneous enhancement and enlargement of the right parotid gland suspicious for metastatic disease. -Atrophy of the bilateral optic nerves and optic chiasm suggested.  On 4/9/24 underwent US guided FNA biopsy of right parotid mass:  Cytology: Poorly differentiated carcinoma with squamous differentiation.  Medical oncology consulted to discuss systemic neoadjuvant therapy to downstage tumor for potential resection in the future, vs primary therapy.  PAIN Stabbing pain right ear and eye.  5/23/24 - Angelita Present during visit, C1 Libtayo 5/2 C2 5/23 Pain on his right eye and ear has improved over the last 2-3 days. The swelling is less as well.  He continues to be teary from the right eye. Using Gabapentin 100 mg PO TID with relief. Mass also appears to be less swollen. Admits to fatigue on days 1-4 of his cycle which improved afterwards. Had an episode of "shakes" during D#3 of his cycle which subsided without acute intervention. No other irAEs noted. Admits to pain while swallowing solids. He is using protein shakes as recommended by nutrition.  6/13/24 C3 Libtayo No major irAEs. The pain and swelling on the right eye has improved while on treatment. The pain in the R ear has subsided. Only feels warmth but denies pain. His main complaint is excessive tearing from the right eye. He always needs to carry a handkerchief due to excessive watering. Eating well.  7/3/24 C4 Libtayo No major irAEs. The pain and swelling on the right eye and right temple has improved. He is able to open his mouth without any issues. Prior to treatment was having severe pain. The lesion on his right cantus is responding to treatment.  7/24/24 C5 Libtayo today. No major irAEs. The lesion on his right temple is responding appropriately to treatment. Continues to have issues with watery tear on the right eye.  9/3 TEB Reina: Jennifer accompanied on TEB C7 Libtayo on 9/7 No major irAEs The lesion on his right temple appears to be crusted over. Admits to less lacrimation from his right eye and pain. Not using pain medications. Labs on 8/18 are WNL. No acute abnormalities.  9/26/24 C8 Libtayo No major irAEs noted from treatment. The lesion on his right temple appears to be crusted over. Admits to less lacrimation from his right eye and pain. Not using pain medications. Using eye drops to the right eye. Patient will schedule MRI of the head.  10/17/2024 Patient seen in treatment room accompanied by nancy and MARIA DE JESUS to receive C9 Libtayo today. The lesion on his right temple appears to be crusted over. Admits to less lacrimation from his right eye and pain. Not using pain medications. Using eye drops to the right eye. Has MR head scheduled 10/13/2024; pending read  Will also recommend to see Dr Curry for follow-up evaluation. I do not think more surgery is indicated, but there is a new subcutaneous lesion ~ 1.5 cm that is soft and mobile.  11/27/24 C11 Libtayo today MR head on 10/13/24 reveals mix response. Improved local disease in the right orbit and parotid gland, suboptimally evaluated here. 3.  Worsened metastatic lymph node in the right temporalis fossa. 4.  Stable chronic infarcts and senescent cerebral changes. Doing relatively well on treatment. No major irAEs.  12/19/24 From the clinical exam, symptoms and MRI the lateral canthus disease improved significantly while on Libtayo. Next non-operative choices: 1 - continue Libtayo and radiate the temporal lesion 2 - change to IPI 1 + NIVO 3 (eg Matisse trial) 3 - start cetuximab and try to reduce size of lesion 4 - radiation On discussing with Dr Martínez, will proceed with cetuximab and radiation combination. We re-reviewed logistics, mechanism of action and most common adverse reactions.  1/16/25 C1 Cetuximab 12/19 C2 1/2/25 C3 today Patient is scheduled to start SBRT to the right temple/periorbital area and node starting next week. He continues to endorse sensitivity to light and tearing of the right eye. He is applying over the counter eye gtts with some relief. No major adverse reactions. The right temple mass is non-painful, non tender on examination. Size is the same.    [de-identified] : Surgical oncology: Al Curry Dermatology: Puneet Hernandez Internist: Jermaine Schmidt  Cardiology: Irwin Killian Ophthalmology: Matthew Goldstein - Yovani Jones (is HCP as well) cell [phone] Jannetteshonna Jones - Jennifer lives 1 floor above in aprt building in [place] Sister - Saranya lives next door to him in same aprt building

## 2025-01-30 NOTE — HISTORY OF PRESENT ILLNESS
[de-identified] : Mr. 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  His most recent procedure for this problem was MARCH 2023. He had excision of the area by dermatology (Dr. Puneet Hernandez) with clinically negative, but microscopically involved margins both peripherally and on the deep aspect.  No further therapy was recommended.  The patient was asymptomatic until November 2022 when he started to develop stabbing pains in the right temple, associated with growth of a mass in the area prompting him to return to dermatology .  An excisional biopsy in March 2023 by dermatology, diagnosed the recurrence. Margins were involved with procedure, and surgical consultation been recommended at that time. However, since his symptoms subsided after the excisional biopsy of the recurrence, he did not attention until now when the pain and swelling have resumed/recurred.  Pathology from May 2022 indicated negative margins around a poorly differentiated carcinoma. However, at that time the concern of a metastatic lesion was raised. No further indications or treatment for recommended at that time.  PET/CT 3/20/24: 1. Asymmetrically enlarged right parotid gland with heterogeneous increased FDG activity is suspicious for patient's primary malignancy. Further evaluation may be performed with ultrasound and percutaneous needle biopsy. 2. FDG-avid soft tissue thickening in superolateral aspect of right orbit, extending to right temporal region, corresponding to enhancing soft tissue seen on MRI dated 3/15/2024/known metastasis from a colon primary. 3. Few minimally FDG-avid bilateral upper lobe pulmonary nodules are indeterminate. If images of a prior CT chest are made available, direct comparison will be made and an addendum issued. Alternatively, dedicated CT of chest is recommended for further characterization. 4. Minimally FDG-avid bilateral adrenal nodules are indeterminate. Further characterization may be performed with MRI. 5. Nonspecific diffuse hypermetabolism in fundus and body of stomach with suggestion of wall thickening on CT. Please correlate clinically and with endoscopy. 6. Increased FDG activity in muscles along the anterior aspect of the proximal bilateral femurs may reflect inflammation. Please correlate clinically. 7. Small FDG-avid focus, left mid tibia, with corresponding abnormality on CT, is indeterminate. Further characterization may be performed with MRI.  MR head 3/15/24:  - Enhancing tissue within the right superolateral orbit extending from the orbital rim towards the superior orbital fissure. This tissue extends superficially into the right superolateral periorbital soft tissues with a bulky component which may be palpable. This is suspicious for recurrent viable neoplasm.  -In addition, there is heterogeneous enhancement and enlargement of the right parotid gland suspicious for metastatic disease. -Atrophy of the bilateral optic nerves and optic chiasm suggested.  On 4/9/24 underwent US guided FNA biopsy of right parotid mass:  Cytology: Poorly differentiated carcinoma with squamous differentiation.  Medical oncology consulted to discuss systemic neoadjuvant therapy to downstage tumor for potential resection in the future, vs primary therapy.  PAIN Stabbing pain right ear and eye.  5/23/24 - Angelita Present during visit, C1 Libtayo 5/2 C2 5/23 Pain on his right eye and ear has improved over the last 2-3 days. The swelling is less as well.  He continues to be teary from the right eye. Using Gabapentin 100 mg PO TID with relief. Mass also appears to be less swollen. Admits to fatigue on days 1-4 of his cycle which improved afterwards. Had an episode of "shakes" during D#3 of his cycle which subsided without acute intervention. No other irAEs noted. Admits to pain while swallowing solids. He is using protein shakes as recommended by nutrition.  6/13/24 C3 Libtayo No major irAEs. The pain and swelling on the right eye has improved while on treatment. The pain in the R ear has subsided. Only feels warmth but denies pain. His main complaint is excessive tearing from the right eye. He always needs to carry a handkerchief due to excessive watering. Eating well.  7/3/24 C4 Libtayo No major irAEs. The pain and swelling on the right eye and right temple has improved. He is able to open his mouth without any issues. Prior to treatment was having severe pain. The lesion on his right cantus is responding to treatment.  7/24/24 C5 Libtayo today. No major irAEs. The lesion on his right temple is responding appropriately to treatment. Continues to have issues with watery tear on the right eye.  9/3 TEB Reina: Jennifer accompanied on TEB C7 Libtayo on 9/7 No major irAEs The lesion on his right temple appears to be crusted over. Admits to less lacrimation from his right eye and pain. Not using pain medications. Labs on 8/18 are WNL. No acute abnormalities.  9/26/24 C8 Libtayo No major irAEs noted from treatment. The lesion on his right temple appears to be crusted over. Admits to less lacrimation from his right eye and pain. Not using pain medications. Using eye drops to the right eye. Patient will schedule MRI of the head.  10/17/2024 Patient seen in treatment room accompanied by nancy and MARIA DE JESUS to receive C9 Libtayo today. The lesion on his right temple appears to be crusted over. Admits to less lacrimation from his right eye and pain. Not using pain medications. Using eye drops to the right eye. Has MR head scheduled 10/13/2024; pending read  Will also recommend to see Dr Curry for follow-up evaluation. I do not think more surgery is indicated, but there is a new subcutaneous lesion ~ 1.5 cm that is soft and mobile.  11/27/24 C11 Libtayo today MR head on 10/13/24 reveals mix response. Improved local disease in the right orbit and parotid gland, suboptimally evaluated here. 3.  Worsened metastatic lymph node in the right temporalis fossa. 4.  Stable chronic infarcts and senescent cerebral changes. Doing relatively well on treatment. No major irAEs.  12/19/24 From the clinical exam, symptoms and MRI the lateral canthus disease improved significantly while on Libtayo. Next non-operative choices: 1 - continue Libtayo and radiate the temporal lesion 2 - change to IPI 1 + NIVO 3 (eg Matisse trial) 3 - start cetuximab and try to reduce size of lesion 4 - radiation On discussing with Dr Martínez, will proceed with cetuximab and radiation combination. We re-reviewed logistics, mechanism of action and most common adverse reactions.  1/16/25 Cetuximab  C1 12/19 C2 1/2/25 C3 1/16/25 C4 1/30/25 Patient is scheduled to start SBRT to the right temple/periorbital area and node started Tuesday 1/28/25 Last dose radiation 2/11/25, and C5 cetuximab will be 2/13/25. The right temple mass is non-painful, non tender on examination. Size is decreased prior to the radiation.  [de-identified] : Surgical oncology: Al Curry Dermatology: Puneet Hernandez Internist: Jermaine Schmidt  Cardiology: Irwin Killian Ophthalmology: Matthew Goldstein - Yovani Jones (is HCP as well) cell [phone] Jannetteshonna Jones - Jennifer lives 1 floor above in aprt building in [place] Sister - aSranya lives next door to him in same aprt building

## 2025-01-30 NOTE — PHYSICAL EXAM
[Ambulatory and capable of all self care but unable to carry out any work activities] : Status 2- Ambulatory and capable of all self care but unable to carry out any work activities. Up and about more than 50% of waking hours [Thin] : thin [Normal] : normal appearance, no rash, nodules, vesicles, ulcers, erythema [de-identified] : Right lateral canthus deformity and tumor into parotid.

## 2025-01-30 NOTE — ASSESSMENT
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 The patient was asymptomatic until November 2022 when he started to develop stabbing pains in the right temple, associated with growth of a mass in the area prompting him to return to dermatology. An excisional biopsy in March 2023 by dermatology, diagnosed the recurrence. Margins were involved with procedure, and surgical consultation been recommended at that time. However, since his symptoms subsided after the excisional biopsy of the recurrence, he did not pay attention until now when the pain and swelling have resumed/recurred.  PET/CT 3/20/24: 1. Asymmetrically enlarged right parotid gland with heterogeneous increased FDG activity is suspicious for patient's primary malignancy. Further evaluation may be performed with ultrasound and percutaneous needle biopsy. 2. FDG-avid soft tissue thickening in superolateral aspect of right orbit, extending to right temporal region, corresponding to enhancing soft tissue seen on MRI dated 3/15/2024/known metastasis from a colon primary.  MR head 3/15/24:  - Enhancing tissue within the right superolateral orbit extending from the orbital rim towards the superior orbital fissure. This tissue extends superficially into the right superolateral periorbital soft tissues with a bulky component which may be palpable. This is suspicious for recurrent viable neoplasm.  -In addition, there is heterogeneous enhancement and enlargement of the right parotid gland suspicious for metastatic disease.  On 4/9/24 underwent US guided FNA biopsy of right parotid mass:  Cytology: Poorly differentiated carcinoma with squamous differentiation.  Libtayo  C1 5/2/24 C11 11/27/24  Patient is responding well to treatment.  The mass on his right lateral canthus is nearly absent. The pain in both his right eye and right temple has improved substantially. Only using Gabapentin at bedtime now.  He is able to open his R eye. The constant tearing from his right eye has improved. Admits to pruritus and intermittent episode of otalgia but not othersome. Continue to follow up with ophthalmology.   He underwent MR head on 10/13/24 which reveals the following;  Improved local disease in the right orbit and parotid gland, suboptimally evaluated here. 3.  Worsened metastatic lymph node in the right temporalis fossa. 4.  Stable chronic infarcts and senescent cerebral changes.  From the clinical exam, symptoms and MRI the lateral canthus disease improved significantly while on Libtayo. Next non-operative choices: 1 - continue Libtayo and radiate the temporal lesion 2 - change to IPI 1 + NIVO 3 (eg Matisse trial) 3 - start cetuximab and try to reduce size of lesion 4 - radiation  On discussing with Dr Martínez, will proceed with cetuximab and radiation combination. We re-reviewed logistics, mechanism of action and most common adverse reactions.  Cetuximab  C1 12/19 C2 1/2/25 C3 1/16/25 C4 1/30/25 Patient is scheduled to start SBRT to the right temple/periorbital area and node started Tuesday 1/28/25 Last dose radiation 2/11/25, and C5 cetuximab will be 2/13/25. The right temple mass is non-painful, non tender on examination. Size is decreased prior to the radiation.  RTO q 2 weeks.

## 2025-02-06 NOTE — HISTORY OF PRESENT ILLNESS
[FreeTextEntry1] :  84-year-old male diagnosed with SCC of RIGHT eye referred by Dr. Marie  3/15/24 MRI Brain IMPRESSION: Enhancing tissue within the right superolateral orbit extending from the orbital rim towards the superior orbital fissure. This tissue extends superficially into the right superolateral periorbital soft tissues with a bulky component which may be palpable. This is suspicious for recurrent viable neoplasm.  In addition, there is heterogeneous enhancement and enlargement of the right parotid gland suspicious for metastatic disease. Atrophy of the bilateral optic nerves and optic chiasm suggested.  3/20/24 PET/CT IMPRESSION: Abnormal skull-to-thigh FDG-PET/CT scan. 1. Asymmetrically enlarged right parotid gland with heterogeneous increased FDG activity is suspicious for patient's primary malignancy. Further evaluation may be performed with ultrasound and percutaneous needle biopsy. 2. FDG-avid soft tissue thickening in superolateral aspect of right orbit, extending to right temporal region, corresponding to enhancing soft tissue seen on MRI dated 3/15/2024/known metastasis from a colon primary. 3. Few minimally FDG-avid bilateral upper lobe pulmonary nodules are indeterminate. If images of a prior CT chest are made available, direct comparison will be made and an addendum issued. Alternatively, dedicated CT of chest is recommended for further characterization. 4. Minimally FDG-avid bilateral adrenal nodules are indeterminate. Further characterization may be performed with MRI. 5. Nonspecific diffuse hypermetabolism in fundus and body of stomach with suggestion of wall thickening on CT. Please correlate clinically and with endoscopy. 6. Increased FDG activity in muscles along the anterior aspect of the proximal bilateral femurs may reflect inflammation. Please correlate clinically. 7. Small FDG-avid focus, left mid tibia, with corresponding abnormality on CT, is indeterminate. Further characterization may be performed with MRI.  4/9/24 underwent US guided FNA biopsy of RIGHT  parotid mass: Poorly differentiated carcinoma with squamous differentiation.  10/13/24 MRI Brain IMPRESSION: 1.  Mixed response to therapy. 2.  Improved local disease in the right orbit and parotid gland, suboptimally evaluated here. 3.  Worsened metastatic lymph node in the right temporalis fossa. 4.  Stable chronic infarcts and senescent cerebral changes.  12/03/24 TEMPORAL, RIGHT, US GUIDED CORE BIOPSY AND FNA - POSITIVE FOR MALIGNANT CELLS - Squamous Cell Carcinoma.  12/24/24 Patient presents today to discuss role of radiation therapy in his care. Patient have trouble hearing. As per family member patient experiences blurry vison.  Continues follow up with Med/Onc Dr. Marie - will proceed with cetuximab and radiation combination.  2/06/25 OTV Completed fx.4/5 Patient reports Right eye irritation, with redness that appears as inflammation of the conjunctiva and sensitivity to light. TobraDex 2 drops BID to right eye. Medication sent to pharmacy. Tolerating treatment well.

## 2025-02-13 NOTE — HISTORY OF PRESENT ILLNESS
[de-identified] : Mr. 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  His most recent procedure for this problem was MARCH 2023. He had excision of the area by dermatology (Dr. Puneet Hernandez) with clinically negative, but microscopically involved margins both peripherally and on the deep aspect.  No further therapy was recommended.  The patient was asymptomatic until November 2022 when he started to develop stabbing pains in the right temple, associated with growth of a mass in the area prompting him to return to dermatology .  An excisional biopsy in March 2023 by dermatology, diagnosed the recurrence. Margins were involved with procedure, and surgical consultation been recommended at that time. However, since his symptoms subsided after the excisional biopsy of the recurrence, he did not attention until now when the pain and swelling have resumed/recurred.  Pathology from May 2022 indicated negative margins around a poorly differentiated carcinoma. However, at that time the concern of a metastatic lesion was raised. No further indications or treatment for recommended at that time.  PET/CT 3/20/24: 1. Asymmetrically enlarged right parotid gland with heterogeneous increased FDG activity is suspicious for patient's primary malignancy. Further evaluation may be performed with ultrasound and percutaneous needle biopsy. 2. FDG-avid soft tissue thickening in superolateral aspect of right orbit, extending to right temporal region, corresponding to enhancing soft tissue seen on MRI dated 3/15/2024/known metastasis from a colon primary. 3. Few minimally FDG-avid bilateral upper lobe pulmonary nodules are indeterminate. If images of a prior CT chest are made available, direct comparison will be made and an addendum issued. Alternatively, dedicated CT of chest is recommended for further characterization. 4. Minimally FDG-avid bilateral adrenal nodules are indeterminate. Further characterization may be performed with MRI. 5. Nonspecific diffuse hypermetabolism in fundus and body of stomach with suggestion of wall thickening on CT. Please correlate clinically and with endoscopy. 6. Increased FDG activity in muscles along the anterior aspect of the proximal bilateral femurs may reflect inflammation. Please correlate clinically. 7. Small FDG-avid focus, left mid tibia, with corresponding abnormality on CT, is indeterminate. Further characterization may be performed with MRI.  MR head 3/15/24:  - Enhancing tissue within the right superolateral orbit extending from the orbital rim towards the superior orbital fissure. This tissue extends superficially into the right superolateral periorbital soft tissues with a bulky component which may be palpable. This is suspicious for recurrent viable neoplasm.  -In addition, there is heterogeneous enhancement and enlargement of the right parotid gland suspicious for metastatic disease. -Atrophy of the bilateral optic nerves and optic chiasm suggested.  On 4/9/24 underwent US guided FNA biopsy of right parotid mass:  Cytology: Poorly differentiated carcinoma with squamous differentiation.  Medical oncology consulted to discuss systemic neoadjuvant therapy to downstage tumor for potential resection in the future, vs primary therapy.  PAIN Stabbing pain right ear and eye.  5/23/24 - [facility] Present during visit, C1 Libtayo 5/2 C2 5/23 Pain on his right eye and ear has improved over the last 2-3 days. The swelling is less as well.  He continues to be teary from the right eye. Using Gabapentin 100 mg PO TID with relief. Mass also appears to be less swollen. Admits to fatigue on days 1-4 of his cycle which improved afterwards. Had an episode of "shakes" during D#3 of his cycle which subsided without acute intervention. No other irAEs noted. Admits to pain while swallowing solids. He is using protein shakes as recommended by nutrition.  6/13/24 C3 Libtayo No major irAEs. The pain and swelling on the right eye has improved while on treatment. The pain in the R ear has subsided. Only feels warmth but denies pain. His main complaint is excessive tearing from the right eye. He always needs to carry a handkerchief due to excessive watering. Eating well.  7/3/24 C4 Libtayo No major irAEs. The pain and swelling on the right eye and right temple has improved. He is able to open his mouth without any issues. Prior to treatment was having severe pain. The lesion on his right cantus is responding to treatment.  7/24/24 C5 Libtayo today. No major irAEs. The lesion on his right temple is responding appropriately to treatment. Continues to have issues with watery tear on the right eye.  9/3 TEB Reina: Jennifer accompanied on TEB C7 Libtayo on 9/7 No major irAEs The lesion on his right temple appears to be crusted over. Admits to less lacrimation from his right eye and pain. Not using pain medications. Labs on 8/18 are WNL. No acute abnormalities.  9/26/24 C8 Libtayo No major irAEs noted from treatment. The lesion on his right temple appears to be crusted over. Admits to less lacrimation from his right eye and pain. Not using pain medications. Using eye drops to the right eye. Patient will schedule MRI of the head.  10/17/2024 Patient seen in treatment room accompanied by nancy and MARIA DE JESUS to receive C9 Libtayo today. The lesion on his right temple appears to be crusted over. Admits to less lacrimation from his right eye and pain. Not using pain medications. Using eye drops to the right eye. Has MR head scheduled 10/13/2024; pending read  Will also recommend to see Dr Curry for follow-up evaluation. I do not think more surgery is indicated, but there is a new subcutaneous lesion ~ 1.5 cm that is soft and mobile.  11/27/24 C11 Libtayo today MR head on 10/13/24 reveals mix response. Improved local disease in the right orbit and parotid gland, suboptimally evaluated here. 3.  Worsened metastatic lymph node in the right temporalis fossa. 4.  Stable chronic infarcts and senescent cerebral changes. Doing relatively well on treatment. No major irAEs.  12/19/24 From the clinical exam, symptoms and MRI the lateral canthus disease improved significantly while on Libtayo. Next non-operative choices: 1 - continue Libtayo and radiate the temporal lesion 2 - change to IPI 1 + NIVO 3 (eg Matisse trial) 3 - start cetuximab and try to reduce size of lesion 4 - radiation On discussing with Dr Martínez, will proceed with cetuximab and radiation combination. We re-reviewed logistics, mechanism of action and most common adverse reactions.  1/16/25 Cetuximab  C1 12/19 C2 1/2/25 C3 1/16/25 C4 1/30/25 Patient is scheduled to start SBRT to the right temple/periorbital area and node started Tuesday 1/28/25 Last dose radiation 2/11/25, and C5 cetuximab will be 2/13/25. The right temple mass is non-painful, non tender on examination. Size is decreased prior to the radiation.  2/13/25 C5 Cetuximab today - last cycle He completed 5/5 fractions of radiation therapy to the right temple/periorbital area and node on 2/11. Patient reports irritation, burning and redness to the right side of his face. Admits to increase in sensitivity to the right eye especially in sunlight. He was given TobraDex to the right eye with some improvement. His excessive lacrimation has improved on the right eye. Tolerating Cetuximab relatively well. No major cutaneous adverse reactions noted. Today will be his last cycle of Cetuximab   [de-identified] : Surgical oncology: Al Curry Dermatology: Puneet Hernandez Internist: Jermaine Schmidt  Cardiology: Irwin Killian Ophthalmology: Matthew Goldstein - Great Niece (is HCP as well) cell [phone] [place] - nephew in law. Penelopezuleyka - niece  Niece - Jennifer lives 1 floor above in aprt building in [place] Sister - Saranya lives next door to him in same aprt building

## 2025-02-13 NOTE — ASSESSMENT
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 The patient was asymptomatic until November 2022 when he started to develop stabbing pains in the right temple, associated with growth of a mass in the area prompting him to return to dermatology. An excisional biopsy in March 2023 by dermatology, diagnosed the recurrence. Margins were involved with procedure, and surgical consultation been recommended at that time. However, since his symptoms subsided after the excisional biopsy of the recurrence, he did not pay attention until now when the pain and swelling have resumed/recurred.  PET/CT 3/20/24: 1. Asymmetrically enlarged right parotid gland with heterogeneous increased FDG activity is suspicious for patient's primary malignancy. Further evaluation may be performed with ultrasound and percutaneous needle biopsy. 2. FDG-avid soft tissue thickening in superolateral aspect of right orbit, extending to right temporal region, corresponding to enhancing soft tissue seen on MRI dated 3/15/2024/known metastasis from a colon primary.  MR head 3/15/24:  - Enhancing tissue within the right superolateral orbit extending from the orbital rim towards the superior orbital fissure. This tissue extends superficially into the right superolateral periorbital soft tissues with a bulky component which may be palpable. This is suspicious for recurrent viable neoplasm.  -In addition, there is heterogeneous enhancement and enlargement of the right parotid gland suspicious for metastatic disease.  On 4/9/24 underwent US guided FNA biopsy of right parotid mass:  Cytology: Poorly differentiated carcinoma with squamous differentiation.  Libtayo  C1 5/2/24 C11 11/27/24  Patient is responding well to treatment.  The mass on his right lateral canthus is nearly absent. The pain in both his right eye and right temple has improved substantially. Only using Gabapentin at bedtime now.  He is able to open his R eye. The constant tearing from his right eye has improved. Admits to pruritus and intermittent episode of otalgia but not othersome. Continue to follow up with ophthalmology.   He underwent MR head on 10/13/24 which reveals the following;  Improved local disease in the right orbit and parotid gland, suboptimally evaluated here. 3.  Worsened metastatic lymph node in the right temporalis fossa. 4.  Stable chronic infarcts and senescent cerebral changes.  From the clinical exam, symptoms and MRI the lateral canthus disease improved significantly while on Libtayo. Next non-operative choices: 1 - continue Libtayo and radiate the temporal lesion 2 - change to IPI 1 + NIVO 3 (eg Matisse trial) 3 - start cetuximab and try to reduce size of lesion 4 - radiation  On discussing with Dr Martínez, will proceed with cetuximab and radiation combination. We re-reviewed logistics, mechanism of action and most common adverse reactions.  Cetuximab  C1 12/19 C2 1/2/25 C3 1/16/25 C4 1/30/25 C5 2/13/25  Patient is scheduled to start SBRT to the right temple/periorbital area and node started Tuesday 1/28/25 Last dose radiation 2/11/25, and C5 cetuximab will be 2/13/25.  C5 Cetuximab today - last cycle  He completed 5/5 fractions of radiation therapy to the right temple/periorbital area and node on 2/11.  Patient reports irritation, burning and redness to the right side of his face. Admits to increase in sensitivity to the right eye especially in sunlight. He was given TobraDex to the right eye with some improvement. His excessive lacrimation has improved on the right eye.  Tolerating Cetuximab relatively well. No major cutaneous adverse reactions noted.  RTO 4/3/25

## 2025-02-13 NOTE — PHYSICAL EXAM
[Ambulatory and capable of all self care but unable to carry out any work activities] : Status 2- Ambulatory and capable of all self care but unable to carry out any work activities. Up and about more than 50% of waking hours [Thin] : thin [Normal] : normal appearance, no rash, nodules, vesicles, ulcers, erythema [de-identified] : Right lateral canthus deformity and tumor into parotid.

## 2025-04-03 NOTE — ASSESSMENT
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 The patient was asymptomatic until November 2022 when he started to develop stabbing pains in the right temple, associated with growth of a mass in the area prompting him to return to dermatology. An excisional biopsy in March 2023 by dermatology, diagnosed the recurrence. Margins were involved with procedure, and surgical consultation been recommended at that time. However, since his symptoms subsided after the excisional biopsy of the recurrence, he did not pay attention until now when the pain and swelling have resumed/recurred.  PET/CT 3/20/24: 1. Asymmetrically enlarged right parotid gland with heterogeneous increased FDG activity is suspicious for patient's primary malignancy. Further evaluation may be performed with ultrasound and percutaneous needle biopsy. 2. FDG-avid soft tissue thickening in superolateral aspect of right orbit, extending to right temporal region, corresponding to enhancing soft tissue seen on MRI dated 3/15/2024/known metastasis from a colon primary.  MR head 3/15/24:  - Enhancing tissue within the right superolateral orbit extending from the orbital rim towards the superior orbital fissure. This tissue extends superficially into the right superolateral periorbital soft tissues with a bulky component which may be palpable. This is suspicious for recurrent viable neoplasm.  -In addition, there is heterogeneous enhancement and enlargement of the right parotid gland suspicious for metastatic disease.  On 4/9/24 underwent US guided FNA biopsy of right parotid mass:  Cytology: Poorly differentiated carcinoma with squamous differentiation.  Libtayo  C1 5/2/24 C11 11/27/24  Patient is responding well to treatment.  The mass on his right lateral canthus is nearly absent. The pain in both his right eye and right temple has improved substantially. Only using Gabapentin at bedtime now.  He is able to open his R eye. The constant tearing from his right eye has improved. Admits to pruritus and intermittent episode of otalgia but not othersome. Continue to follow up with ophthalmology.   He underwent MR head on 10/13/24 which reveals the following;  Improved local disease in the right orbit and parotid gland, suboptimally evaluated here. 3.  Worsened metastatic lymph node in the right temporalis fossa. 4.  Stable chronic infarcts and senescent cerebral changes.  From the clinical exam, symptoms and MRI the lateral canthus disease improved significantly while on Libtayo. Next non-operative choices: 1 - continue Libtayo and radiate the temporal lesion 2 - change to IPI 1 + NIVO 3 (eg Matisse trial) 3 - start cetuximab and try to reduce size of lesion 4 - radiation  On discussing with Dr Martínez, will proceed with cetuximab and radiation combination. We re-reviewed logistics, mechanism of action and most common adverse reactions.  Cetuximab  C1 12/19 C2 1/2/25 C3 1/16/25 C4 1/30/25 C5 2/13/25 - last cycle  Patient is scheduled to start SBRT to the right temple/periorbital area and node started Tuesday 1/28/25 Last dose radiation 2/11/25, and C5 cetuximab will be 2/13/25.  C5 Cetuximab today - last cycle  He completed 5/5 fractions of radiation therapy to the right temple/periorbital area and node on 2/11.  Patient has completed radiation therapy.  He developed radiation induced dermatitis which has improved significantly. Currently applying Silver Sulfadiazine to the area with relief. There was notable swelling, crusting and irritation to the right side of his face. We reviewed pictures provided by Angelita ([facility]). He is recovering.  Appetite and energy levels are improving very mildly.  f/u with Dr. Martínez today.  Labs today  f/u 3-4 months.  Dr. Marie agrees with above plan.

## 2025-04-03 NOTE — PHYSICAL EXAM
[Ambulatory and capable of all self care but unable to carry out any work activities] : Status 2- Ambulatory and capable of all self care but unable to carry out any work activities. Up and about more than 50% of waking hours [Thin] : thin [Normal] : normal appearance, no rash, nodules, vesicles, ulcers, erythema [de-identified] : Right lateral canthus deformity and tumor into parotid.

## 2025-04-03 NOTE — HISTORY OF PRESENT ILLNESS
[de-identified] : Mr. 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  His most recent procedure for this problem was MARCH 2023. He had excision of the area by dermatology (Dr. Puneet Hernandez) with clinically negative, but microscopically involved margins both peripherally and on the deep aspect.  No further therapy was recommended.  The patient was asymptomatic until November 2022 when he started to develop stabbing pains in the right temple, associated with growth of a mass in the area prompting him to return to dermatology .  An excisional biopsy in March 2023 by dermatology, diagnosed the recurrence. Margins were involved with procedure, and surgical consultation been recommended at that time. However, since his symptoms subsided after the excisional biopsy of the recurrence, he did not attention until now when the pain and swelling have resumed/recurred.  Pathology from May 2022 indicated negative margins around a poorly differentiated carcinoma. However, at that time the concern of a metastatic lesion was raised. No further indications or treatment for recommended at that time.  PET/CT 3/20/24: 1. Asymmetrically enlarged right parotid gland with heterogeneous increased FDG activity is suspicious for patient's primary malignancy. Further evaluation may be performed with ultrasound and percutaneous needle biopsy. 2. FDG-avid soft tissue thickening in superolateral aspect of right orbit, extending to right temporal region, corresponding to enhancing soft tissue seen on MRI dated 3/15/2024/known metastasis from a colon primary. 3. Few minimally FDG-avid bilateral upper lobe pulmonary nodules are indeterminate. If images of a prior CT chest are made available, direct comparison will be made and an addendum issued. Alternatively, dedicated CT of chest is recommended for further characterization. 4. Minimally FDG-avid bilateral adrenal nodules are indeterminate. Further characterization may be performed with MRI. 5. Nonspecific diffuse hypermetabolism in fundus and body of stomach with suggestion of wall thickening on CT. Please correlate clinically and with endoscopy. 6. Increased FDG activity in muscles along the anterior aspect of the proximal bilateral femurs may reflect inflammation. Please correlate clinically. 7. Small FDG-avid focus, left mid tibia, with corresponding abnormality on CT, is indeterminate. Further characterization may be performed with MRI.  MR head 3/15/24:  - Enhancing tissue within the right superolateral orbit extending from the orbital rim towards the superior orbital fissure. This tissue extends superficially into the right superolateral periorbital soft tissues with a bulky component which may be palpable. This is suspicious for recurrent viable neoplasm.  -In addition, there is heterogeneous enhancement and enlargement of the right parotid gland suspicious for metastatic disease. -Atrophy of the bilateral optic nerves and optic chiasm suggested.  On 4/9/24 underwent US guided FNA biopsy of right parotid mass:  Cytology: Poorly differentiated carcinoma with squamous differentiation.  Medical oncology consulted to discuss systemic neoadjuvant therapy to downstage tumor for potential resection in the future, vs primary therapy.  PAIN Stabbing pain right ear and eye.  5/23/24 - Angelita Present during visit, C1 Libtayo 5/2 C2 5/23 Pain on his right eye and ear has improved over the last 2-3 days. The swelling is less as well.  He continues to be teary from the right eye. Using Gabapentin 100 mg PO TID with relief. Mass also appears to be less swollen. Admits to fatigue on days 1-4 of his cycle which improved afterwards. Had an episode of "shakes" during D#3 of his cycle which subsided without acute intervention. No other irAEs noted. Admits to pain while swallowing solids. He is using protein shakes as recommended by nutrition.  6/13/24 C3 Libtayo No major irAEs. The pain and swelling on the right eye has improved while on treatment. The pain in the R ear has subsided. Only feels warmth but denies pain. His main complaint is excessive tearing from the right eye. He always needs to carry a handkerchief due to excessive watering. Eating well.  7/3/24 C4 Libtayo No major irAEs. The pain and swelling on the right eye and right temple has improved. He is able to open his mouth without any issues. Prior to treatment was having severe pain. The lesion on his right cantus is responding to treatment.  7/24/24 C5 Libtayo today. No major irAEs. The lesion on his right temple is responding appropriately to treatment. Continues to have issues with watery tear on the right eye.  9/3 TEB Reina: Jennifer accompanied on TEB C7 Libtayo on 9/7 No major irAEs The lesion on his right temple appears to be crusted over. Admits to less lacrimation from his right eye and pain. Not using pain medications. Labs on 8/18 are WNL. No acute abnormalities.  9/26/24 C8 Libtayo No major irAEs noted from treatment. The lesion on his right temple appears to be crusted over. Admits to less lacrimation from his right eye and pain. Not using pain medications. Using eye drops to the right eye. Patient will schedule MRI of the head.  10/17/2024 Patient seen in treatment room accompanied by nancy and MARIA DE JESUS to receive C9 Libtayo today. The lesion on his right temple appears to be crusted over. Admits to less lacrimation from his right eye and pain. Not using pain medications. Using eye drops to the right eye. Has MR head scheduled 10/13/2024; pending read  Will also recommend to see Dr Curry for follow-up evaluation. I do not think more surgery is indicated, but there is a new subcutaneous lesion ~ 1.5 cm that is soft and mobile.  11/27/24 C11 Libtayo today MR head on 10/13/24 reveals mix response. Improved local disease in the right orbit and parotid gland, suboptimally evaluated here. 3.  Worsened metastatic lymph node in the right temporalis fossa. 4.  Stable chronic infarcts and senescent cerebral changes. Doing relatively well on treatment. No major irAEs.  12/19/24 From the clinical exam, symptoms and MRI the lateral canthus disease improved significantly while on Libtayo. Next non-operative choices: 1 - continue Libtayo and radiate the temporal lesion 2 - change to IPI 1 + NIVO 3 (eg Matisse trial) 3 - start cetuximab and try to reduce size of lesion 4 - radiation On discussing with Dr Martínez, will proceed with cetuximab and radiation combination. We re-reviewed logistics, mechanism of action and most common adverse reactions.  1/16/25 Cetuximab  C1 12/19 C2 1/2/25 C3 1/16/25 C4 1/30/25 Patient is scheduled to start SBRT to the right temple/periorbital area and node started Tuesday 1/28/25 Last dose radiation 2/11/25, and C5 cetuximab will be 2/13/25. The right temple mass is non-painful, non tender on examination. Size is decreased prior to the radiation.  2/13/25 C5 Cetuximab today - last cycle He completed 5/5 fractions of radiation therapy to the right temple/periorbital area and node on 2/11. Patient reports irritation, burning and redness to the right side of his face. Admits to increase in sensitivity to the right eye especially in sunlight. He was given TobraDex to the right eye with some improvement. His excessive lacrimation has improved on the right eye. Tolerating Cetuximab relatively well. No major cutaneous adverse reactions noted. Today will be his last cycle of Cetuximab   4/3/25 Patient has completed radiation therapy. He developed radiation induced dermatitis which has improved significantly. Currently applying Silver Sulfadiazine to the area with relief. There was notable swelling, crusting and irritation to the right side of his face. We reviewed pictures provided by Angelita Huang). He is recovering. Appetite and energy levels are improving very mildly.   [de-identified] : Surgical oncology: Al Curry Dermatology: Puneet Hernandez Internist: Jermaine Schmidt  Cardiology: Irwin Killian Ophthalmology: Matthew Goldstein - Great Niece (is HCP as well) cell [phone] [place] - nephew in law. Penelopezuleyka - niece  Niece - Jennifer lives 1 floor above in aprt building in [place] Sister - Saranya lives next door to him in same aprt building   Yes

## 2025-04-06 NOTE — REASON FOR VISIT
[Post-Treatment Evaluation] : post-treatment evaluation for [Consideration of Curative Therapy] : consideration of curative therapy for

## 2025-04-10 NOTE — PHYSICAL EXAM
[General Appearance - Well Developed] : well developed [Thin] : thin [] : no respiratory distress [Heart Sounds] : normal S1 and S2 [Oriented To Time, Place, And Person] : oriented to person, place, and time [de-identified] : Open are over the right temple with stropic skin and hyperpigmentation [de-identified] : No palapbale nodes on the neck [de-identified] : vision worse on the right compared to left

## 2025-04-10 NOTE — PHYSICAL EXAM
[General Appearance - Well Developed] : well developed [Thin] : thin [] : no respiratory distress [Heart Sounds] : normal S1 and S2 [Oriented To Time, Place, And Person] : oriented to person, place, and time [de-identified] : Open are over the right temple with stropic skin and hyperpigmentation [de-identified] : No palapbale nodes on the neck [de-identified] : vision worse on the right compared to left

## 2025-04-10 NOTE — REVIEW OF SYSTEMS
[Negative] : Neurological [Blurred Vision: Grade 2 - Symptomatic; limiting instrumental ADL] : Blurred Vision: Grade 2 - Symptomatic; limiting instrumental ADL [Skin Atrophy: Grade 1 - Covering <10% BSA; associated with telangiectasias or changes in skin color] : Skin Atrophy: Grade 1 - Covering <10% BSA; associated with telangiectasias or changes in skin color [Dermatitis Radiation: Grade 1 - Faint erythema or dry desquamation] : Dermatitis Radiation: Grade 1 - Faint erythema or dry desquamation [FreeTextEntry2] : losing weight, decreased appetite [FreeTextEntry3] : decreased vision bilaterally, worse on the right [de-identified] : area of open skin to right temple

## 2025-04-10 NOTE — REVIEW OF SYSTEMS
[Negative] : Neurological [Blurred Vision: Grade 2 - Symptomatic; limiting instrumental ADL] : Blurred Vision: Grade 2 - Symptomatic; limiting instrumental ADL [Skin Atrophy: Grade 1 - Covering <10% BSA; associated with telangiectasias or changes in skin color] : Skin Atrophy: Grade 1 - Covering <10% BSA; associated with telangiectasias or changes in skin color [Dermatitis Radiation: Grade 1 - Faint erythema or dry desquamation] : Dermatitis Radiation: Grade 1 - Faint erythema or dry desquamation [FreeTextEntry2] : losing weight, decreased appetite [FreeTextEntry3] : decreased vision bilaterally, worse on the right [de-identified] : area of open skin to right temple

## 2025-04-10 NOTE — REVIEW OF SYSTEMS
[Negative] : Neurological [Blurred Vision: Grade 2 - Symptomatic; limiting instrumental ADL] : Blurred Vision: Grade 2 - Symptomatic; limiting instrumental ADL [Skin Atrophy: Grade 1 - Covering <10% BSA; associated with telangiectasias or changes in skin color] : Skin Atrophy: Grade 1 - Covering <10% BSA; associated with telangiectasias or changes in skin color [Dermatitis Radiation: Grade 1 - Faint erythema or dry desquamation] : Dermatitis Radiation: Grade 1 - Faint erythema or dry desquamation [FreeTextEntry2] : losing weight, decreased appetite [FreeTextEntry3] : decreased vision bilaterally, worse on the right [de-identified] : area of open skin to right temple

## 2025-04-10 NOTE — PHYSICAL EXAM
[General Appearance - Well Developed] : well developed [Thin] : thin [] : no respiratory distress [Heart Sounds] : normal S1 and S2 [Oriented To Time, Place, And Person] : oriented to person, place, and time [de-identified] : Open are over the right temple with stropic skin and hyperpigmentation [de-identified] : No palapbale nodes on the neck [de-identified] : vision worse on the right compared to left

## 2025-04-10 NOTE — PHYSICAL EXAM
[General Appearance - Well Developed] : well developed [Thin] : thin [] : no respiratory distress [Heart Sounds] : normal S1 and S2 [Oriented To Time, Place, And Person] : oriented to person, place, and time [de-identified] : Open are over the right temple with stropic skin and hyperpigmentation [de-identified] : No palapbale nodes on the neck [de-identified] : vision worse on the right compared to left

## 2025-04-10 NOTE — HISTORY OF PRESENT ILLNESS
[FreeTextEntry1] :  84-year-old male diagnosed with SCC of RIGHT eye referred by Dr. Marie  3/15/24 MRI Brain IMPRESSION: Enhancing tissue within the right superolateral orbit extending from the orbital rim towards the superior orbital fissure. This tissue extends superficially into the right superolateral periorbital soft tissues with a bulky component which may be palpable. This is suspicious for recurrent viable neoplasm.  In addition, there is heterogeneous enhancement and enlargement of the right parotid gland suspicious for metastatic disease. Atrophy of the bilateral optic nerves and optic chiasm suggested.  3/20/24 PET/CT IMPRESSION: Abnormal skull-to-thigh FDG-PET/CT scan. 1. Asymmetrically enlarged right parotid gland with heterogeneous increased FDG activity is suspicious for patient's primary malignancy. Further evaluation may be performed with ultrasound and percutaneous needle biopsy. 2. FDG-avid soft tissue thickening in superolateral aspect of right orbit, extending to right temporal region, corresponding to enhancing soft tissue seen on MRI dated 3/15/2024/known metastasis from a colon primary. 3. Few minimally FDG-avid bilateral upper lobe pulmonary nodules are indeterminate. If images of a prior CT chest are made available, direct comparison will be made and an addendum issued. Alternatively, dedicated CT of chest is recommended for further characterization. 4. Minimally FDG-avid bilateral adrenal nodules are indeterminate. Further characterization may be performed with MRI. 5. Nonspecific diffuse hypermetabolism in fundus and body of stomach with suggestion of wall thickening on CT. Please correlate clinically and with endoscopy. 6. Increased FDG activity in muscles along the anterior aspect of the proximal bilateral femurs may reflect inflammation. Please correlate clinically. 7. Small FDG-avid focus, left mid tibia, with corresponding abnormality on CT, is indeterminate. Further characterization may be performed with MRI.  4/9/24 underwent US guided FNA biopsy of RIGHT  parotid mass: Poorly differentiated carcinoma with squamous differentiation.  10/13/24 MRI Brain IMPRESSION: 1.  Mixed response to therapy. 2.  Improved local disease in the right orbit and parotid gland, suboptimally evaluated here. 3.  Worsened metastatic lymph node in the right temporalis fossa. 4.  Stable chronic infarcts and senescent cerebral changes.  12/03/24 TEMPORAL, RIGHT, US GUIDED CORE BIOPSY AND FNA - POSITIVE FOR MALIGNANT CELLS - Squamous Cell Carcinoma.  12/24/24 Patient presents today to discuss role of radiation therapy in his care. Patient have trouble hearing. As per family member patient experiences blurry vison.  Continues follow up with Med/Onc Dr. Marie - will proceed with cetuximab and radiation combination.  He completed radiation therapy, SBRT to the head and neck, 4000 cgy in 5 fractions 1/28-2/11/2025. Presents today for post treatment evaluation.  Continues to follow with Dr. Marie. Completed cetuximab in february.   Today he is feeling overall well. Appetite is not good. Down 8lb since february.  Continues to have headaches to the right side of the head- to the ear and neck. Feels vision is worsening, particularly to the right eye. Has glaucoma and cataracts.  Continues to use silvadene to right temple.

## 2025-04-10 NOTE — REVIEW OF SYSTEMS
[Negative] : Neurological [Blurred Vision: Grade 2 - Symptomatic; limiting instrumental ADL] : Blurred Vision: Grade 2 - Symptomatic; limiting instrumental ADL [Skin Atrophy: Grade 1 - Covering <10% BSA; associated with telangiectasias or changes in skin color] : Skin Atrophy: Grade 1 - Covering <10% BSA; associated with telangiectasias or changes in skin color [Dermatitis Radiation: Grade 1 - Faint erythema or dry desquamation] : Dermatitis Radiation: Grade 1 - Faint erythema or dry desquamation [FreeTextEntry2] : losing weight, decreased appetite [FreeTextEntry3] : decreased vision bilaterally, worse on the right [de-identified] : area of open skin to right temple